# Patient Record
(demographics unavailable — no encounter records)

---

## 2024-12-01 NOTE — HISTORY OF PRESENT ILLNESS
[FreeTextEntry1] : Patient presenting for pain and swelling to right great toe nail border. Patient is accompanied by her father. Patient  relates pain to the lateral border of the right great toe. Patient relates the pain started about 2-3 days ago and also noticed some clear drainage.

## 2024-12-01 NOTE — REVIEW OF SYSTEMS
[Negative] : Heme/Lymph [de-identified] : Pain and erythema to the lateral border of the hallux b/l and medial border of the right hallux - healed

## 2024-12-01 NOTE — REASON FOR VISIT
[Follow-Up Visit] : a follow-up visit for [Ingrown Nail] : ingrown nail [Parent] : parent [FreeTextEntry2] : Patient is being seen today for R foot big toe ingrown nail.

## 2024-12-01 NOTE — REVIEW OF SYSTEMS
[Negative] : Heme/Lymph [de-identified] : Pain and erythema to the lateral border of the hallux b/l and medial border of the right hallux - healed

## 2024-12-01 NOTE — PHYSICAL EXAM
[General Appearance - Alert] : alert [General Appearance - In No Acute Distress] : in no acute distress [Ankle Swelling (On Exam)] : not present [Varicose Veins Of Lower Extremities] : not present [] : not present [2+] : left foot dorsalis pedis 2+ [de-identified] : 5/5 muscle strength for all muscles and tendons crossing the foot and ankle joints, ankle joint and STJ ROM intact b/l. No pain with calf compression b/l. Mild POP to the medial and lateral nail border of the right hallux and no pain to the lateral border of the left hallux  [FreeTextEntry1] : Light touch sensation intact to the level of the digits b/l. [Oriented To Time, Place, And Person] : oriented to person, place, and time [Affect] : the affect was normal

## 2024-12-01 NOTE — ASSESSMENT
[FreeTextEntry1] : .Patient seen and evaluated. Discussed etiology and treatment plan. Patient and parent verbalized understanding.  Discussed with patient  and parent about nonpermanent permanent options of nail avulsion for the lateral border of the right hallux. Patient and parent  will think about more invasive options and opted for slant back procedure.  Due to the severe incurvated nail edge present and severe pain recommended a slant back procedure discussed risk complications and expected recovery course with the patient.  They understand that the nail margin will regrow and may become symptomatic again in the future.  Using freeze spray, lateral border of the right hallux was prepped and using sterile nail nippers standard procedure was performed.  Topical bacitracin was applied and covered with Band-Aid. Discussed with patient to wear supportive shoe gear with wide toed shoes and extra depth in toe box to accommodate the deformity. Discussed with patient and parent surgical resection of the offending nail border secondary to recurrence of the ingrown nail.  .RTC as needed.  Spent 20 minutes for patient care and medical decision making.

## 2024-12-01 NOTE — PHYSICAL EXAM
[General Appearance - Alert] : alert [General Appearance - In No Acute Distress] : in no acute distress [Ankle Swelling (On Exam)] : not present [Varicose Veins Of Lower Extremities] : not present [] : not present [2+] : left foot dorsalis pedis 2+ [de-identified] : 5/5 muscle strength for all muscles and tendons crossing the foot and ankle joints, ankle joint and STJ ROM intact b/l. No pain with calf compression b/l. Mild POP to the medial and lateral nail border of the right hallux and no pain to the lateral border of the left hallux  [FreeTextEntry1] : Light touch sensation intact to the level of the digits b/l. [Oriented To Time, Place, And Person] : oriented to person, place, and time [Affect] : the affect was normal

## 2025-02-03 NOTE — REASON FOR VISIT
[Follow-Up Visit] : a follow-up visit for [Ingrown Nail] : ingrown nail [Parent] : parent [FreeTextEntry2] : Patient is being seen today for R Big toe ingrown nail s/p slantback and to review wound culture results.

## 2025-03-03 NOTE — REASON FOR VISIT
[Post Operative Visit] : a post operative visit for [Ingrown Nail] : ingrown nail [Parent] : parent [FreeTextEntry2] : Patient is being seen today for R big toe ingrown nail s/p slantback on 1/22/25.

## 2025-04-14 NOTE — REASON FOR VISIT
[Follow-Up Visit] : a follow-up visit for [Ingrown Nail] : ingrown nail [Parent] : parent [FreeTextEntry2] : Patient is being seen today for B/L big toe ingrown nails.

## 2025-05-12 NOTE — PHYSICAL EXAM
[General Appearance - Alert] : alert [General Appearance - In No Acute Distress] : in no acute distress [2+] : left foot dorsalis pedis 2+ [Oriented To Time, Place, And Person] : oriented to person, place, and time [Affect] : the affect was normal [Ankle Swelling (On Exam)] : not present [Varicose Veins Of Lower Extremities] : not present [] : not present [de-identified] : 5/5 muscle strength for all muscles and tendons crossing the foot and ankle joints, ankle joint and STJ ROM intact b/l. No pain with calf compression b/l. Mild POP to the medial and lateral nail border of the right hallux and no pain to the lateral border of the left hallux  [FreeTextEntry1] : Light touch sensation intact to the level of the digits b/l.

## 2025-05-12 NOTE — REVIEW OF SYSTEMS
[Negative] : Heme/Lymph [de-identified] : Pain and erythema to the lateral border of the hallux b/l and medial border of the right hallux - healed

## 2025-05-12 NOTE — REVIEW OF SYSTEMS
[Negative] : Heme/Lymph [de-identified] : Pain and erythema to the lateral border of the hallux b/l and medial border of the right hallux - healed

## 2025-05-12 NOTE — HISTORY OF PRESENT ILLNESS
[FreeTextEntry1] : Patient presenting for mild pain and tenderness to the  right lateral nail border of the great toe nail. Patient is accompanied by her father. Patient relates the pain started about 2-3 days ago and also noticed some swelling. Patient has dance competition and states

## 2025-05-12 NOTE — PHYSICAL EXAM
[General Appearance - Alert] : alert [General Appearance - In No Acute Distress] : in no acute distress [2+] : left foot dorsalis pedis 2+ [Oriented To Time, Place, And Person] : oriented to person, place, and time [Affect] : the affect was normal [Ankle Swelling (On Exam)] : not present [Varicose Veins Of Lower Extremities] : not present [] : not present [de-identified] : 5/5 muscle strength for all muscles and tendons crossing the foot and ankle joints, ankle joint and STJ ROM intact b/l. No pain with calf compression b/l. Mild POP to the medial and lateral nail border of the right hallux and no pain to the lateral border of the left hallux  [FreeTextEntry1] : Light touch sensation intact to the level of the digits b/l.

## 2025-05-12 NOTE — ASSESSMENT
[FreeTextEntry1] : .Patient seen and evaluated. Discussed etiology and treatment plan. Patient and parent verbalized understanding.  Discussed with patient  and parent about nonpermanent permanent options of nail avulsion for the lateral border of the right hallux. Patient and parent  will think about more invasive options and opted for slant back procedure.  Due to the severe incurvated nail edge present and severe pain recommended a slant back procedure discussed risk complications and expected recovery course with the patient.  They understand that the nail margin will regrow and may become symptomatic again in the future.  Using freeze spray, lateral border of the right hallux was prepped and using sterile nail nippers standard procedure was performed.  Topical betadine  was applied and covered with DSD . Discussed with patient to wear supportive shoe gear with wide toed shoes and extra depth in toe box to accommodate the deformity. Discussed with patient and parent surgical resection of the offending nail border secondary to recurrence of the ingrown nail.  .RTC as needed.  Spent 20 minutes for patient care and medical decision making.

## 2025-06-01 NOTE — REVIEW OF SYSTEMS
[Negative] : Heme/Lymph [de-identified] : Pain and erythema to the lateral border of the hallux b/l and medial border of the right hallux - healed

## 2025-06-01 NOTE — HISTORY OF PRESENT ILLNESS
[FreeTextEntry1] : Patient presenting for mild pain and tenderness to the left medial nail border of the great toe nail. Patient is accompanied by her father. Patient relates the pain started about a few days ago and denies any swelling. Patient has dance competition and is concerned if it will lead to an infection.

## 2025-06-01 NOTE — ASSESSMENT
[FreeTextEntry1] : .Patient seen and evaluated. Discussed etiology and treatment plan. Patient and parent verbalized understanding.  Discussed with patient  and parent about nonpermanent permanent options of nail avulsion for the lateral border of the right hallux. Patient and parent  will think about more invasive options and opted for slant back procedure.  Due to the severe incurvated nail edge present and severe pain recommended a slant back procedure discussed risk complications and expected recovery course with the patient.  They understand that the nail margin will regrow and may become symptomatic again in the future.  Using freeze spray, medial border of the left hallux was prepped and using sterile nail nippers standard procedure was performed. Topical betadine  was applied and covered with DSD . Discussed with patient to wear supportive shoe gear with wide toed shoes and extra depth in toe box to accommodate the deformity. Discussed with patient and parent surgical resection of the offending nail border secondary to recurrence of the ingrown nail.  .RTC as needed.  Spent 20 minutes for patient care and medical decision making.

## 2025-06-01 NOTE — PHYSICAL EXAM
[General Appearance - In No Acute Distress] : in no acute distress [General Appearance - Alert] : alert [Ankle Swelling (On Exam)] : not present [Varicose Veins Of Lower Extremities] : not present [] : not present [2+] : left foot dorsalis pedis 2+ [de-identified] : 5/5 muscle strength for all muscles and tendons crossing the foot and ankle joints, ankle joint and STJ ROM intact b/l. No pain with calf compression b/l. Mild POP to the medial and lateral nail border of the right hallux and no pain to the lateral border of the left hallux  [FreeTextEntry1] : Light touch sensation intact to the level of the digits b/l. [Affect] : the affect was normal [Oriented To Time, Place, And Person] : oriented to person, place, and time

## 2025-06-01 NOTE — REVIEW OF SYSTEMS
[Negative] : Heme/Lymph [de-identified] : Pain and erythema to the lateral border of the hallux b/l and medial border of the right hallux - healed

## 2025-06-01 NOTE — PHYSICAL EXAM
[General Appearance - Alert] : alert [General Appearance - In No Acute Distress] : in no acute distress [Ankle Swelling (On Exam)] : not present [Varicose Veins Of Lower Extremities] : not present [] : not present [2+] : left foot dorsalis pedis 2+ [de-identified] : 5/5 muscle strength for all muscles and tendons crossing the foot and ankle joints, ankle joint and STJ ROM intact b/l. No pain with calf compression b/l. Mild POP to the medial and lateral nail border of the right hallux and no pain to the lateral border of the left hallux  [FreeTextEntry1] : Light touch sensation intact to the level of the digits b/l. [Oriented To Time, Place, And Person] : oriented to person, place, and time [Affect] : the affect was normal